# Patient Record
Sex: MALE | Race: NATIVE HAWAIIAN OR OTHER PACIFIC ISLANDER | NOT HISPANIC OR LATINO | Employment: FULL TIME | URBAN - METROPOLITAN AREA
[De-identification: names, ages, dates, MRNs, and addresses within clinical notes are randomized per-mention and may not be internally consistent; named-entity substitution may affect disease eponyms.]

---
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
## 2022-04-09 ENCOUNTER — HISTORICAL (OUTPATIENT)
Dept: ADMINISTRATIVE | Facility: HOSPITAL | Age: 22
End: 2022-04-09
Payer: COMMERCIAL

## 2022-04-26 VITALS
HEIGHT: 66 IN | SYSTOLIC BLOOD PRESSURE: 130 MMHG | OXYGEN SATURATION: 100 % | WEIGHT: 155.19 LBS | DIASTOLIC BLOOD PRESSURE: 81 MMHG | BODY MASS INDEX: 24.94 KG/M2

## 2022-05-16 ENCOUNTER — OFFICE VISIT (OUTPATIENT)
Dept: URGENT CARE | Facility: CLINIC | Age: 22
End: 2022-05-16
Payer: COMMERCIAL

## 2022-05-16 VITALS
HEIGHT: 66 IN | SYSTOLIC BLOOD PRESSURE: 132 MMHG | RESPIRATION RATE: 17 BRPM | TEMPERATURE: 99 F | DIASTOLIC BLOOD PRESSURE: 82 MMHG | BODY MASS INDEX: 24.27 KG/M2 | OXYGEN SATURATION: 100 % | WEIGHT: 151 LBS | HEART RATE: 69 BPM

## 2022-05-16 DIAGNOSIS — R51.9 ACUTE NONINTRACTABLE HEADACHE, UNSPECIFIED HEADACHE TYPE: Primary | ICD-10-CM

## 2022-05-16 DIAGNOSIS — R11.0 NAUSEA: ICD-10-CM

## 2022-05-16 DIAGNOSIS — J02.0 STREP PHARYNGITIS: ICD-10-CM

## 2022-05-16 LAB
CTP QC/QA: YES
FLUAV AG NPH QL: NEGATIVE
FLUBV AG NPH QL: NEGATIVE
S PYO RRNA THROAT QL PROBE: POSITIVE
SARS-COV-2 RDRP RESP QL NAA+PROBE: NEGATIVE

## 2022-05-16 PROCEDURE — 1159F PR MEDICATION LIST DOCUMENTED IN MEDICAL RECORD: ICD-10-PCS | Mod: CPTII,,, | Performed by: PHYSICIAN ASSISTANT

## 2022-05-16 PROCEDURE — 99213 PR OFFICE/OUTPT VISIT, EST, LEVL III, 20-29 MIN: ICD-10-PCS | Mod: 25,,, | Performed by: PHYSICIAN ASSISTANT

## 2022-05-16 PROCEDURE — 1160F RVW MEDS BY RX/DR IN RCRD: CPT | Mod: CPTII,,, | Performed by: PHYSICIAN ASSISTANT

## 2022-05-16 PROCEDURE — 1159F MED LIST DOCD IN RCRD: CPT | Mod: CPTII,,, | Performed by: PHYSICIAN ASSISTANT

## 2022-05-16 PROCEDURE — 3079F DIAST BP 80-89 MM HG: CPT | Mod: CPTII,,, | Performed by: PHYSICIAN ASSISTANT

## 2022-05-16 PROCEDURE — 99213 OFFICE O/P EST LOW 20 MIN: CPT | Mod: 25,,, | Performed by: PHYSICIAN ASSISTANT

## 2022-05-16 PROCEDURE — 3075F SYST BP GE 130 - 139MM HG: CPT | Mod: CPTII,,, | Performed by: PHYSICIAN ASSISTANT

## 2022-05-16 PROCEDURE — U0002: ICD-10-PCS | Mod: QW,,, | Performed by: PHYSICIAN ASSISTANT

## 2022-05-16 PROCEDURE — 3008F PR BODY MASS INDEX (BMI) DOCUMENTED: ICD-10-PCS | Mod: CPTII,,, | Performed by: PHYSICIAN ASSISTANT

## 2022-05-16 PROCEDURE — 87804 INFLUENZA ASSAY W/OPTIC: CPT | Mod: QW,,, | Performed by: PHYSICIAN ASSISTANT

## 2022-05-16 PROCEDURE — U0002 COVID-19 LAB TEST NON-CDC: HCPCS | Mod: QW,,, | Performed by: PHYSICIAN ASSISTANT

## 2022-05-16 PROCEDURE — 3075F PR MOST RECENT SYSTOLIC BLOOD PRESS GE 130-139MM HG: ICD-10-PCS | Mod: CPTII,,, | Performed by: PHYSICIAN ASSISTANT

## 2022-05-16 PROCEDURE — 87880 POCT RAPID STREP A: ICD-10-PCS | Mod: QW,,, | Performed by: PHYSICIAN ASSISTANT

## 2022-05-16 PROCEDURE — 1160F PR REVIEW ALL MEDS BY PRESCRIBER/CLIN PHARMACIST DOCUMENTED: ICD-10-PCS | Mod: CPTII,,, | Performed by: PHYSICIAN ASSISTANT

## 2022-05-16 PROCEDURE — 3008F BODY MASS INDEX DOCD: CPT | Mod: CPTII,,, | Performed by: PHYSICIAN ASSISTANT

## 2022-05-16 PROCEDURE — 3079F PR MOST RECENT DIASTOLIC BLOOD PRESSURE 80-89 MM HG: ICD-10-PCS | Mod: CPTII,,, | Performed by: PHYSICIAN ASSISTANT

## 2022-05-16 PROCEDURE — 87804 POCT INFLUENZA A/B: ICD-10-PCS | Mod: 59,QW,, | Performed by: PHYSICIAN ASSISTANT

## 2022-05-16 PROCEDURE — 87880 STREP A ASSAY W/OPTIC: CPT | Mod: QW,,, | Performed by: PHYSICIAN ASSISTANT

## 2022-05-16 RX ORDER — BUPROPION HYDROCHLORIDE 300 MG/1
300 TABLET ORAL
COMMUNITY
Start: 2022-02-23 | End: 2023-02-07

## 2022-05-16 RX ORDER — AMOXICILLIN AND CLAVULANATE POTASSIUM 875; 125 MG/1; MG/1
1 TABLET, FILM COATED ORAL 2 TIMES DAILY
Qty: 20 TABLET | Refills: 0 | Status: SHIPPED | OUTPATIENT
Start: 2022-05-16 | End: 2022-05-26

## 2022-05-16 NOTE — PROGRESS NOTES
"Subjective:       Patient ID: Samira Molina is a 21 y.o. male.    Vitals:  height is 5' 6" (1.676 m) and weight is 68.5 kg (151 lb). His temperature is 99.3 °F (37.4 °C). His blood pressure is 132/82 and his pulse is 69. His respiration is 17 and oxygen saturation is 100%.     Chief Complaint: Headache (Since yesterday) and Nausea (Since yesterday )    Patient is a 21-year-old male who presents to urgent care with complaints of headache and nausea for the past two days. Patient states headache is like a band around his head.  He denies any changes in vision, numbness or tingling, slurred speech, changes in gait, facial drooping, or weakness of extremities. Patient states he feels overall nauseous. He denies any associated vomiting, diarrhea, or abdominal pain. Patient denies any upper respiratory symptoms.  He is feeling somewhat achy. He otherwise denies any fever, chills, chest pain, shortness a breath, wheezing, rashes, or neck stiffness.    Review of Systems:  General: Denies fever, chills, fatigue, and change in appetite   Eyes: Denies change in vision, eye redness, eye drainage, eye pain  ENT: Denies ear pain or pressure, rhinorrhea, nasal congestion, sore throat, and trouble swallowing  Resp: Denies wheezing, and shortness of breath   Cardio: Denies chest pain, palpitations, pleuritic pain, and edema   GI: Denies vomiting, diarrhea, and abdominal pain   MSK: Denies trauma, joint pain, and trouble ambulating   Neuro: Denies LOC, dizziness, seizure like activity, and focal deficits   Skin: Denies rashes, open lesions and ulcers     Objective:     Physical Exam:  General: Well developed, well nourished, awake and alert. No acute distress.   Eye: PERRLA, EOMI, no scleral icterus, clear conjunctiva, eyelids normal.  Ear: No tragal tenderness. Ear canal patent. TMs intact bilaterally. Left TM erythematous. Right TM normal.   Mouth: Oropharynx with moderate erythema. Exudate on the posterior pharynx. No trismus. " No drooling. Tonsils removed. No peritonsillar swelling.   Neck: Positive for non tender cervical adenipathy, no neck stiffness,  trachea midline, no visible thyromegaly.   Respiratory: Clear to auscultation bilaterally, normal respiratory rate and inspiratory effort.   Cardiovascular: RRR w/o murmurs, no LE edema.   Musculoskeletal: Normal gait. No joint swelling.   Integumentary: No rashes or skin lesions noted. No cyanosis or jaundice.   Neurologic: Facial expressions even, CN1-12 grossly intact, speech is clear, cognition in tact.     Assessment:       1. Acute nonintractable headache, unspecified headache type    2. Nausea    3. Strep pharyngitis        Plan:     Take antibiotics as prescfibed until finished. Rest as much as possible and maintain adequate hydration. Monitor for fever. Ibuprofen and Tylenol for pain and fever. Swap out toothbrush after 4 days of antibiotics. You are contagious until you have been on antibiotics for 24 hours and have been fever free for 24 hours. Seek further medical attention immediately at the first sign of new or worsening symptoms. Follow up with PCP or pediatrician within the next 72 hours.     Acute nonintractable headache, unspecified headache type  -     POCT Influenza A/B  -     POCT COVID-19 Rapid Screening  -     POCT rapid strep A    Nausea  -     POCT Influenza A/B  -     POCT COVID-19 Rapid Screening  -     POCT rapid strep A    Strep pharyngitis    Other orders  -     amoxicillin-clavulanate 875-125mg (AUGMENTIN) 875-125 mg per tablet; Take 1 tablet by mouth 2 (two) times daily. for 10 days  Dispense: 20 tablet; Refill: 0

## 2022-05-16 NOTE — PATIENT INSTRUCTIONS
Take antibiotics as prescfibed until finished. Rest as much as possible and maintain adequate hydration. Monitor for fever. Ibuprofen and Tylenol for pain and fever. Swap out toothbrush after 4 days of antibiotics. You are contagious until you have been on antibiotics for 24 hours and have been fever free for 24 hours. Seek further medical attention immediately at the first sign of new or worsening symptoms. Follow up with PCP or pediatrician within the next 72 hours.

## 2022-06-20 ENCOUNTER — OFFICE VISIT (OUTPATIENT)
Dept: URGENT CARE | Facility: CLINIC | Age: 22
End: 2022-06-20
Payer: COMMERCIAL

## 2022-06-20 VITALS
SYSTOLIC BLOOD PRESSURE: 131 MMHG | DIASTOLIC BLOOD PRESSURE: 75 MMHG | HEIGHT: 66 IN | BODY MASS INDEX: 24.27 KG/M2 | OXYGEN SATURATION: 99 % | WEIGHT: 151 LBS | HEART RATE: 57 BPM | TEMPERATURE: 98 F | RESPIRATION RATE: 18 BRPM

## 2022-06-20 DIAGNOSIS — R11.2 NAUSEA AND VOMITING, INTRACTABILITY OF VOMITING NOT SPECIFIED, UNSPECIFIED VOMITING TYPE: Primary | ICD-10-CM

## 2022-06-20 PROCEDURE — 3078F DIAST BP <80 MM HG: CPT | Mod: CPTII,,, | Performed by: PHYSICIAN ASSISTANT

## 2022-06-20 PROCEDURE — 1160F RVW MEDS BY RX/DR IN RCRD: CPT | Mod: CPTII,,, | Performed by: PHYSICIAN ASSISTANT

## 2022-06-20 PROCEDURE — 1159F MED LIST DOCD IN RCRD: CPT | Mod: CPTII,,, | Performed by: PHYSICIAN ASSISTANT

## 2022-06-20 PROCEDURE — 3008F BODY MASS INDEX DOCD: CPT | Mod: CPTII,,, | Performed by: PHYSICIAN ASSISTANT

## 2022-06-20 PROCEDURE — 3008F PR BODY MASS INDEX (BMI) DOCUMENTED: ICD-10-PCS | Mod: CPTII,,, | Performed by: PHYSICIAN ASSISTANT

## 2022-06-20 PROCEDURE — 1160F PR REVIEW ALL MEDS BY PRESCRIBER/CLIN PHARMACIST DOCUMENTED: ICD-10-PCS | Mod: CPTII,,, | Performed by: PHYSICIAN ASSISTANT

## 2022-06-20 PROCEDURE — 1159F PR MEDICATION LIST DOCUMENTED IN MEDICAL RECORD: ICD-10-PCS | Mod: CPTII,,, | Performed by: PHYSICIAN ASSISTANT

## 2022-06-20 PROCEDURE — 99213 PR OFFICE/OUTPT VISIT, EST, LEVL III, 20-29 MIN: ICD-10-PCS | Mod: ,,, | Performed by: PHYSICIAN ASSISTANT

## 2022-06-20 PROCEDURE — 3075F SYST BP GE 130 - 139MM HG: CPT | Mod: CPTII,,, | Performed by: PHYSICIAN ASSISTANT

## 2022-06-20 PROCEDURE — 3075F PR MOST RECENT SYSTOLIC BLOOD PRESS GE 130-139MM HG: ICD-10-PCS | Mod: CPTII,,, | Performed by: PHYSICIAN ASSISTANT

## 2022-06-20 PROCEDURE — 99213 OFFICE O/P EST LOW 20 MIN: CPT | Mod: ,,, | Performed by: PHYSICIAN ASSISTANT

## 2022-06-20 PROCEDURE — 3078F PR MOST RECENT DIASTOLIC BLOOD PRESSURE < 80 MM HG: ICD-10-PCS | Mod: CPTII,,, | Performed by: PHYSICIAN ASSISTANT

## 2022-06-20 RX ORDER — ONDANSETRON 4 MG/1
4 TABLET, ORALLY DISINTEGRATING ORAL EVERY 8 HOURS PRN
Qty: 15 TABLET | Refills: 0 | Status: SHIPPED | OUTPATIENT
Start: 2022-06-20 | End: 2022-07-25

## 2022-06-20 NOTE — PROGRESS NOTES
"Subjective:       Patient ID: Samira Molina is a 21 y.o. male.    Vitals:  height is 5' 6" (1.676 m) and weight is 68.5 kg (151 lb). His oral temperature is 98.4 °F (36.9 °C). His blood pressure is 131/75 and his pulse is 57 (abnormal). His respiration is 18 and oxygen saturation is 99%.     Chief Complaint: Emesis    Nausea/Vomiting this AM.  He denies any associated diarrhea constipation focal abdominal pain fever sore throat cough or shortness of breath.  Patient states he is feeling a little better and needs a work excuse.  He declines COVID testing today.    Emesis         Gastrointestinal: Positive for vomiting.       Objective:      Physical Exam   Constitutional: He is oriented to person, place, and time. He appears well-developed.   HENT:   Head: Normocephalic and atraumatic.   Ears:   Right Ear: External ear normal.   Left Ear: External ear normal.   Nose: Nose normal.   Mouth/Throat: Mucous membranes are normal. Mucous membranes are moist. No posterior oropharyngeal erythema.   Eyes: Conjunctivae and lids are normal.   Neck: Trachea normal. Neck supple.   Cardiovascular: Normal rate, regular rhythm and normal heart sounds.   Pulmonary/Chest: Effort normal and breath sounds normal. No respiratory distress.   Abdominal: Normal appearance and bowel sounds are normal. He exhibits no distension, no abdominal bruit, no pulsatile midline mass and no mass. Soft. There is abdominal tenderness. There is no rebound and no guarding.   Musculoskeletal: Normal range of motion.         General: Normal range of motion.   Neurological: He is alert and oriented to person, place, and time. He has normal strength.   Skin: Skin is warm, dry, intact, not diaphoretic and not pale.   Psychiatric: His speech is normal and behavior is normal. Judgment and thought content normal.   Nursing note and vitals reviewed.   Mild generalized lower abdominal tenderness.         Previous History      Review of patient's allergies " "indicates:  No Known Allergies    Past Medical History:   Diagnosis Date    Depression      Current Outpatient Medications   Medication Instructions    buPROPion (WELLBUTRIN XL) 300 mg, Oral    ondansetron (ZOFRAN-ODT) 4 mg, Oral, Every 8 hours PRN     History reviewed. No pertinent surgical history.  Family History   Family history unknown: Yes       Social History     Tobacco Use    Smoking status: Never Smoker    Smokeless tobacco: Never Used   Substance Use Topics    Alcohol use: Never    Drug use: Never        Physical Exam      Vital Signs Reviewed   /75   Pulse (!) 57   Temp 98.4 °F (36.9 °C) (Oral)   Resp 18   Ht 5' 6" (1.676 m)   Wt 68.5 kg (151 lb)   SpO2 99%   BMI 24.37 kg/m²        Procedures    Procedures     Labs     Results for orders placed or performed in visit on 05/16/22   POCT Influenza A/B   Result Value Ref Range    Rapid Influenza A Ag Negative Negative    Rapid Influenza B Ag Negative Negative     Acceptable Yes    POCT COVID-19 Rapid Screening   Result Value Ref Range    POC Rapid COVID Negative Negative     Acceptable Yes    POCT rapid strep A   Result Value Ref Range    Rapid Strep A Screen Positive (A) Negative     Acceptable Yes          Assessment:       1. Nausea and vomiting, intractability of vomiting not specified, unspecified vomiting type          Plan:         Nausea and vomiting, intractability of vomiting not specified, unspecified vomiting type  -     ondansetron (ZOFRAN-ODT) 4 MG TbDL; Take 1 tablet (4 mg total) by mouth every 8 (eight) hours as needed (nausea).  Dispense: 15 tablet; Refill: 0     Drink plenty of fluids    BRAT diet.     Imodium as directed if needed for non-bloody diarrhea.     Get plenty of rest.    Follow-up with your primary care doctor      Go to emergency department with any significant change or worsening symptoms.    Tylenol or Motrin as needed for fever.                 "

## 2022-07-18 ENCOUNTER — TELEPHONE (OUTPATIENT)
Dept: FAMILY MEDICINE | Facility: CLINIC | Age: 22
End: 2022-07-18
Payer: COMMERCIAL

## 2022-07-18 DIAGNOSIS — Z11.59 NEED FOR HEPATITIS C SCREENING TEST: ICD-10-CM

## 2022-07-18 DIAGNOSIS — Z00.00 ANNUAL PHYSICAL EXAM: Primary | ICD-10-CM

## 2022-07-18 DIAGNOSIS — Z11.4 SCREENING FOR HIV (HUMAN IMMUNODEFICIENCY VIRUS): ICD-10-CM

## 2022-07-18 PROBLEM — F90.9 ATTENTION DEFICIT HYPERACTIVITY DISORDER (ADHD): Status: ACTIVE | Noted: 2022-07-18

## 2022-07-18 PROBLEM — F41.1 GENERALIZED ANXIETY DISORDER: Status: ACTIVE | Noted: 2022-07-18

## 2022-07-18 NOTE — TELEPHONE ENCOUNTER
1. Are there any outstanding tasks in patient's chart?    labs  2. Do we have outstanding/pending referrals?    n  3. Has the patient been seen in an ER, Urgent Care, or admitted since last visit?  Urgent care    4. Has patient seen any other health care providers since last visit?  n    5.  Has patient had any blood work or x-rays done since last visit?  Patient will complete labs at [facility]    Please order wellness labs at [facility]

## 2022-07-23 ENCOUNTER — LAB VISIT (OUTPATIENT)
Dept: LAB | Facility: HOSPITAL | Age: 22
End: 2022-07-23
Attending: NURSE PRACTITIONER
Payer: COMMERCIAL

## 2022-07-23 DIAGNOSIS — Z00.00 ANNUAL PHYSICAL EXAM: ICD-10-CM

## 2022-07-23 DIAGNOSIS — Z11.59 NEED FOR HEPATITIS C SCREENING TEST: ICD-10-CM

## 2022-07-23 DIAGNOSIS — Z11.4 SCREENING FOR HIV (HUMAN IMMUNODEFICIENCY VIRUS): ICD-10-CM

## 2022-07-23 LAB
ALBUMIN SERPL-MCNC: 4.6 GM/DL (ref 3.5–5)
ALBUMIN/GLOB SERPL: 1.5 RATIO (ref 1.1–2)
ALP SERPL-CCNC: 61 UNIT/L
ALT SERPL-CCNC: 44 UNIT/L (ref 0–55)
AST SERPL-CCNC: 25 UNIT/L (ref 5–34)
BASOPHILS # BLD AUTO: 0.03 X10(3)/MCL (ref 0–0.2)
BASOPHILS NFR BLD AUTO: 0.7 %
BILIRUBIN DIRECT+TOT PNL SERPL-MCNC: 1.3 MG/DL
BUN SERPL-MCNC: 22.3 MG/DL (ref 8.9–20.6)
CALCIUM SERPL-MCNC: 9.8 MG/DL (ref 8.4–10.2)
CHLORIDE SERPL-SCNC: 106 MMOL/L (ref 98–107)
CHOLEST SERPL-MCNC: 135 MG/DL
CHOLEST/HDLC SERPL: 3 {RATIO} (ref 0–5)
CO2 SERPL-SCNC: 26 MMOL/L (ref 22–29)
CREAT SERPL-MCNC: 1.19 MG/DL (ref 0.73–1.18)
EOSINOPHIL # BLD AUTO: 0.12 X10(3)/MCL (ref 0–0.9)
EOSINOPHIL NFR BLD AUTO: 2.8 %
ERYTHROCYTE [DISTWIDTH] IN BLOOD BY AUTOMATED COUNT: 12.8 % (ref 11.5–17)
GLOBULIN SER-MCNC: 3.1 GM/DL (ref 2.4–3.5)
GLUCOSE SERPL-MCNC: 82 MG/DL (ref 74–100)
HCT VFR BLD AUTO: 46.7 % (ref 42–52)
HCV AB SERPL QL IA: NONREACTIVE
HDLC SERPL-MCNC: 48 MG/DL (ref 35–60)
HGB BLD-MCNC: 14.8 GM/DL (ref 14–18)
HIV 1+2 AB+HIV1 P24 AG SERPL QL IA: NONREACTIVE
IMM GRANULOCYTES # BLD AUTO: 0 X10(3)/MCL (ref 0–0.04)
IMM GRANULOCYTES NFR BLD AUTO: 0 %
LDLC SERPL CALC-MCNC: 78 MG/DL (ref 50–140)
LYMPHOCYTES # BLD AUTO: 1.68 X10(3)/MCL (ref 0.6–4.6)
LYMPHOCYTES NFR BLD AUTO: 39.3 %
MCH RBC QN AUTO: 27.1 PG (ref 27–31)
MCHC RBC AUTO-ENTMCNC: 31.7 MG/DL (ref 33–36)
MCV RBC AUTO: 85.5 FL (ref 80–94)
MONOCYTES # BLD AUTO: 0.38 X10(3)/MCL (ref 0.1–1.3)
MONOCYTES NFR BLD AUTO: 8.9 %
NEUTROPHILS # BLD AUTO: 2.1 X10(3)/MCL (ref 2.1–9.2)
NEUTROPHILS NFR BLD AUTO: 48.3 %
PLATELET # BLD AUTO: 246 X10(3)/MCL (ref 130–400)
PMV BLD AUTO: 10.4 FL (ref 7.4–10.4)
POTASSIUM SERPL-SCNC: 4.5 MMOL/L (ref 3.5–5.1)
PROT SERPL-MCNC: 7.7 GM/DL (ref 6.4–8.3)
RBC # BLD AUTO: 5.46 X10(6)/MCL (ref 4.7–6.1)
SODIUM SERPL-SCNC: 142 MMOL/L (ref 136–145)
TRIGL SERPL-MCNC: 46 MG/DL (ref 34–140)
TSH SERPL-ACNC: 1.12 UIU/ML (ref 0.35–4.94)
VLDLC SERPL CALC-MCNC: 9 MG/DL
WBC # SPEC AUTO: 4.3 X10(3)/MCL (ref 4.5–11.5)

## 2022-07-23 PROCEDURE — 84443 ASSAY THYROID STIM HORMONE: CPT

## 2022-07-23 PROCEDURE — 80061 LIPID PANEL: CPT

## 2022-07-23 PROCEDURE — 36415 COLL VENOUS BLD VENIPUNCTURE: CPT

## 2022-07-23 PROCEDURE — 87389 HIV-1 AG W/HIV-1&-2 AB AG IA: CPT

## 2022-07-23 PROCEDURE — 86803 HEPATITIS C AB TEST: CPT

## 2022-07-23 PROCEDURE — 80053 COMPREHEN METABOLIC PANEL: CPT

## 2022-07-23 PROCEDURE — 85025 COMPLETE CBC W/AUTO DIFF WBC: CPT

## 2022-07-25 ENCOUNTER — OFFICE VISIT (OUTPATIENT)
Dept: FAMILY MEDICINE | Facility: CLINIC | Age: 22
End: 2022-07-25
Payer: COMMERCIAL

## 2022-07-25 VITALS
BODY MASS INDEX: 25.81 KG/M2 | TEMPERATURE: 99 F | HEIGHT: 66 IN | WEIGHT: 160.63 LBS | HEART RATE: 76 BPM | RESPIRATION RATE: 16 BRPM | SYSTOLIC BLOOD PRESSURE: 120 MMHG | OXYGEN SATURATION: 97 % | DIASTOLIC BLOOD PRESSURE: 76 MMHG

## 2022-07-25 DIAGNOSIS — Z11.59 NEED FOR HEPATITIS C SCREENING TEST: ICD-10-CM

## 2022-07-25 DIAGNOSIS — Z11.4 SCREENING FOR HIV (HUMAN IMMUNODEFICIENCY VIRUS): ICD-10-CM

## 2022-07-25 DIAGNOSIS — Z13.31 POSITIVE DEPRESSION SCREENING: ICD-10-CM

## 2022-07-25 DIAGNOSIS — F33.9 DEPRESSION, RECURRENT: ICD-10-CM

## 2022-07-25 DIAGNOSIS — Z00.00 ANNUAL PHYSICAL EXAM: Primary | ICD-10-CM

## 2022-07-25 PROBLEM — F32.9 MAJOR DEPRESSIVE DISORDER WITH SINGLE EPISODE: Status: RESOLVED | Noted: 2022-07-25 | Resolved: 2022-07-25

## 2022-07-25 PROBLEM — F32.9 MAJOR DEPRESSIVE DISORDER WITH SINGLE EPISODE: Status: ACTIVE | Noted: 2022-07-25

## 2022-07-25 PROCEDURE — 1159F PR MEDICATION LIST DOCUMENTED IN MEDICAL RECORD: ICD-10-PCS | Mod: CPTII,,, | Performed by: NURSE PRACTITIONER

## 2022-07-25 PROCEDURE — 99395 PREV VISIT EST AGE 18-39: CPT | Mod: ,,, | Performed by: NURSE PRACTITIONER

## 2022-07-25 PROCEDURE — 1160F PR REVIEW ALL MEDS BY PRESCRIBER/CLIN PHARMACIST DOCUMENTED: ICD-10-PCS | Mod: CPTII,,, | Performed by: NURSE PRACTITIONER

## 2022-07-25 PROCEDURE — 3074F PR MOST RECENT SYSTOLIC BLOOD PRESSURE < 130 MM HG: ICD-10-PCS | Mod: CPTII,,, | Performed by: NURSE PRACTITIONER

## 2022-07-25 PROCEDURE — 1159F MED LIST DOCD IN RCRD: CPT | Mod: CPTII,,, | Performed by: NURSE PRACTITIONER

## 2022-07-25 PROCEDURE — 3008F PR BODY MASS INDEX (BMI) DOCUMENTED: ICD-10-PCS | Mod: CPTII,,, | Performed by: NURSE PRACTITIONER

## 2022-07-25 PROCEDURE — 99395 PR PREVENTIVE VISIT,EST,18-39: ICD-10-PCS | Mod: ,,, | Performed by: NURSE PRACTITIONER

## 2022-07-25 PROCEDURE — 1160F RVW MEDS BY RX/DR IN RCRD: CPT | Mod: CPTII,,, | Performed by: NURSE PRACTITIONER

## 2022-07-25 PROCEDURE — 3078F PR MOST RECENT DIASTOLIC BLOOD PRESSURE < 80 MM HG: ICD-10-PCS | Mod: CPTII,,, | Performed by: NURSE PRACTITIONER

## 2022-07-25 PROCEDURE — 3074F SYST BP LT 130 MM HG: CPT | Mod: CPTII,,, | Performed by: NURSE PRACTITIONER

## 2022-07-25 PROCEDURE — 3008F BODY MASS INDEX DOCD: CPT | Mod: CPTII,,, | Performed by: NURSE PRACTITIONER

## 2022-07-25 PROCEDURE — 3078F DIAST BP <80 MM HG: CPT | Mod: CPTII,,, | Performed by: NURSE PRACTITIONER

## 2022-07-25 NOTE — PROGRESS NOTES
Subjective:       Patient ID: Samira Molina is a 21 y.o. male.    Chief Complaint: Annual Exam      HPI     This is a 21-year-old  male who presents to clinic today for his annual wellness exam.  Patient has a history of anxiety, depression, ADHD.  Patient states that he feels like his Wellbutrin really is not working as well as it used to.  States that he does not have the motivation to get up out of bed every day go to work.  He does get out of bed and go to work every day but it is a struggle.  He is also having trouble sleeping at night.  Denies any suicidal or homicidal ideations.  States that he just has a lot going on and just got a new job.  States new job is less stressful so he should not be having trouble getting out of bed to go to work.      Review of Systems   Constitutional: Negative.    HENT: Negative.    Eyes: Negative.    Respiratory: Negative.    Cardiovascular: Negative.    Gastrointestinal: Negative.    Musculoskeletal: Negative.    Integumentary:  Negative.   Allergic/Immunologic: Negative.    Neurological: Negative.    Hematological: Negative.    Psychiatric/Behavioral: Positive for dysphoric mood.   All other systems reviewed and are negative.          The patient's Health Maintenance was reviewed and the following appears to be due:   Health Maintenance Due   Topic Date Due    COVID-19 Vaccine (1) Never done    HPV Vaccines (1 - Male 2-dose series) Never done       Past Medical History:  Past Medical History:   Diagnosis Date    Anxiety disorder, unspecified     Attention-deficit hyperactivity disorder, unspecified type     Depression      Past Surgical History:   Procedure Laterality Date    ADENOIDECTOMY      MOUTH SURGERY      MYRINGOTOMY W/ TUBES       Review of patient's allergies indicates:  No Known Allergies  Current Outpatient Medications on File Prior to Visit   Medication Sig Dispense Refill    buPROPion (WELLBUTRIN XL) 300 MG 24 hr tablet Take 300 mg by mouth.   "    [DISCONTINUED] ondansetron (ZOFRAN-ODT) 4 MG TbDL Take 1 tablet (4 mg total) by mouth every 8 (eight) hours as needed (nausea). 15 tablet 0     No current facility-administered medications on file prior to visit.     Social History     Socioeconomic History    Marital status: Single   Tobacco Use    Smoking status: Never Smoker    Smokeless tobacco: Never Used   Substance and Sexual Activity    Alcohol use: Yes     Comment: socially    Drug use: Never    Sexual activity: Yes     Family History   Family history unknown: Yes         Objective:       /76 (BP Location: Left arm)   Pulse 76   Temp 99 °F (37.2 °C) (Oral)   Resp 16   Ht 5' 6" (1.676 m)   Wt 72.8 kg (160 lb 9.6 oz)   SpO2 97%   BMI 25.92 kg/m²      Physical Exam  Vitals and nursing note reviewed.   Constitutional:       Appearance: Normal appearance. He is normal weight.   HENT:      Head: Normocephalic and atraumatic.      Right Ear: Tympanic membrane, ear canal and external ear normal.      Left Ear: Tympanic membrane, ear canal and external ear normal.      Nose: Nose normal.      Mouth/Throat:      Mouth: Mucous membranes are moist.      Pharynx: Oropharynx is clear.   Eyes:      Extraocular Movements: Extraocular movements intact.      Conjunctiva/sclera: Conjunctivae normal.      Pupils: Pupils are equal, round, and reactive to light.   Cardiovascular:      Rate and Rhythm: Normal rate and regular rhythm.      Pulses: Normal pulses.      Heart sounds: Normal heart sounds.   Pulmonary:      Effort: Pulmonary effort is normal.      Breath sounds: Normal breath sounds.   Abdominal:      General: Abdomen is flat. Bowel sounds are normal.      Palpations: Abdomen is soft.   Musculoskeletal:         General: Normal range of motion.      Cervical back: Normal range of motion and neck supple.   Skin:     General: Skin is warm and dry.   Neurological:      General: No focal deficit present.      Mental Status: He is alert and oriented " to person, place, and time.   Psychiatric:         Mood and Affect: Mood normal.         Behavior: Behavior normal.         Thought Content: Thought content normal.         Judgment: Judgment normal.         Labs  Lab Visit on 07/23/2022   Component Date Value Ref Range Status    Sodium Level 07/23/2022 142  136 - 145 mmol/L Final    Potassium Level 07/23/2022 4.5  3.5 - 5.1 mmol/L Final    Chloride 07/23/2022 106  98 - 107 mmol/L Final    Carbon Dioxide 07/23/2022 26  22 - 29 mmol/L Final    Glucose Level 07/23/2022 82  74 - 100 mg/dL Final    Blood Urea Nitrogen 07/23/2022 22.3 (A) 8.9 - 20.6 mg/dL Final    Creatinine 07/23/2022 1.19 (A) 0.73 - 1.18 mg/dL Final    Calcium Level Total 07/23/2022 9.8  8.4 - 10.2 mg/dL Final    Protein Total 07/23/2022 7.7  6.4 - 8.3 gm/dL Final    Albumin Level 07/23/2022 4.6  3.5 - 5.0 gm/dL Final    Globulin 07/23/2022 3.1  2.4 - 3.5 gm/dL Final    Albumin/Globulin Ratio 07/23/2022 1.5  1.1 - 2.0 ratio Final    Bilirubin Total 07/23/2022 1.3  <=1.5 mg/dL Final    Alkaline Phosphatase 07/23/2022 61  <=750 unit/L Final    Alanine Aminotransferase 07/23/2022 44  0 - 55 unit/L Final    Aspartate Aminotransferase 07/23/2022 25  5 - 34 unit/L Final    Estimated GFR-Non  07/23/2022 >60  mls/min/1.73/m2 Final    Hepatitis C Antibody 07/23/2022 Nonreactive  Nonreactive Final    HIV 07/23/2022 Nonreactive  Nonreactive Final    Cholesterol Total 07/23/2022 135  <=200 mg/dL Final    HDL Cholesterol 07/23/2022 48  35 - 60 mg/dL Final    Triglyceride 07/23/2022 46  34 - 140 mg/dL Final    Cholesterol/HDL Ratio 07/23/2022 3  0 - 5 Final    Very Low Density Lipoprotein 07/23/2022 9   Final    LDL Cholesterol 07/23/2022 78.00  50.00 - 140.00 mg/dL Final    Thyroid Stimulating Hormone 07/23/2022 1.1230  0.3500 - 4.9400 uIU/mL Final    WBC 07/23/2022 4.3 (A) 4.5 - 11.5 x10(3)/mcL Final    RBC 07/23/2022 5.46  4.70 - 6.10 x10(6)/mcL Final    Hgb 07/23/2022  14.8  14.0 - 18.0 gm/dL Final    Hct 07/23/2022 46.7  42.0 - 52.0 % Final    MCV 07/23/2022 85.5  80.0 - 94.0 fL Final    MCH 07/23/2022 27.1  27.0 - 31.0 pg Final    MCHC 07/23/2022 31.7 (A) 33.0 - 36.0 mg/dL Final    RDW 07/23/2022 12.8  11.5 - 17.0 % Final    Platelet 07/23/2022 246  130 - 400 x10(3)/mcL Final    MPV 07/23/2022 10.4  7.4 - 10.4 fL Final    Neut % 07/23/2022 48.3  % Final    Lymph % 07/23/2022 39.3  % Final    Mono % 07/23/2022 8.9  % Final    Eos % 07/23/2022 2.8  % Final    Basophil % 07/23/2022 0.7  % Final    Lymph # 07/23/2022 1.68  0.6 - 4.6 x10(3)/mcL Final    Neut # 07/23/2022 2.1  2.1 - 9.2 x10(3)/mcL Final    Mono # 07/23/2022 0.38  0.1 - 1.3 x10(3)/mcL Final    Eos # 07/23/2022 0.12  0 - 0.9 x10(3)/mcL Final    Baso # 07/23/2022 0.03  0 - 0.2 x10(3)/mcL Final    IG# 07/23/2022 0.00  0 - 0.04 x10(3)/mcL Final    IG% 07/23/2022 0.0  % Final             Assessment:       Problem List Items Addressed This Visit        Psychiatric    Depression, recurrent      Other Visit Diagnoses     Annual physical exam    -  Primary    Need for hepatitis C screening test        Screening for HIV (human immunodeficiency virus)        Positive depression screening        I have reviewed the positive depression score which warrants active treatment with psychotherapy and/or medications.          Plan:         1. Annual physical exam  Labs reviewed with patient, will repeat in 1 year.    2. Need for hepatitis C screening test  Nonreactive    3. Screening for HIV (human immunodeficiency virus)  Nonreactive    4. Positive depression screening  Have discussed with patient multiple times that I think needs to go to counseling.  [facility], phone number given to patient to schedule appointment.  Follow-up 6 weeks.    5. Depression, recurrent  For I recommended counseling prior to changing patient's medication.  I do think a lot of this is situational and patient even  states he feels like he just needs to talk to someone.  Will follow-up in 6 weeks after he starts counseling and if needed adjust medications at that time.            I have used clinical judgement based on duration and functional status to consider definite necessity for treatment.

## 2022-07-25 NOTE — LETTER
July 25, 2022      [facility]  [address] [place] [address]  Phone: [phone]       Patient: Samira Molina   YOB: 2000  Date of Visit: 07/25/2022    To Whom It May Concern:    Henrietta Molina  was at [facility] on 07/25/2022. The patient may return to work/school on 07/26/2022 with no restrictions. If you have any questions or concerns, or if I can be of further assistance, please do not hesitate to contact me.    Sincerely,    SHABNAM Tay

## 2022-07-28 ENCOUNTER — TELEPHONE (OUTPATIENT)
Dept: FAMILY MEDICINE | Facility: CLINIC | Age: 22
End: 2022-07-28
Payer: COMMERCIAL

## 2022-07-28 NOTE — TELEPHONE ENCOUNTER
----- Message from Laila Khan MA sent at 7/28/2022 11:14 AM CDT -----  Regarding: FW: referral    ----- Message -----  From: Keyana Harry  Sent: 7/28/2022  10:14 AM CDT  To: Jaylin FLORENCE Staff  Subject: referral                                         Type:  Patient Requesting Referral    Who Called:pt  Does the patient already have the specialty appointment scheduled?:no  If yes, what is the date of that appointment?:  Referral to What Specialty:psychologist   Reason for Referral:therapy or counseling  Does the patient want the referral with a specific physician?:  Is the [facility]?:  Patient Requesting a Response?:yes  Would the patient rather a call back or a response via [facility]? C/b  Best Call Back Number:[phone]  Additional Information:  [facility] not accept pt's insurance   Pt needs a referral that accepts [employer] medicaid

## 2022-07-28 NOTE — TELEPHONE ENCOUNTER
Patient does not know if he has medicaid.  He was notified to contact his insurance or look on the insurance website to see which places he would be able to schedule with.  Patient verbalized understanding.

## 2022-07-28 NOTE — TELEPHONE ENCOUNTER
Patient doesn't have Medicaid. He has private [employer]. Does he have Medicaid secondary? I didn't refer him to the counseling place, I just suggested it because their website said they take [employer]. He needs to check with his insurance what places take it or just call around and ask.

## 2022-09-02 ENCOUNTER — TELEPHONE (OUTPATIENT)
Dept: FAMILY MEDICINE | Facility: CLINIC | Age: 22
End: 2022-09-02
Payer: COMMERCIAL

## 2022-09-02 NOTE — TELEPHONE ENCOUNTER
Are there any outstanding tasks in patient's chart?    n  2. Do we have outstanding/pending referrals?  n    3. Has the patient been seen in an ER, Urgent Care, or admitted since last visit?  Jessica Perez-Covid Test-Neg    4. Has patient seen any other health care providers since last visit?  n    5.  Has patient had any blood work or x-rays done since last visit?   n

## 2022-09-09 ENCOUNTER — OFFICE VISIT (OUTPATIENT)
Dept: FAMILY MEDICINE | Facility: CLINIC | Age: 22
End: 2022-09-09
Payer: COMMERCIAL

## 2022-09-09 VITALS
WEIGHT: 163.19 LBS | OXYGEN SATURATION: 99 % | RESPIRATION RATE: 16 BRPM | HEART RATE: 69 BPM | TEMPERATURE: 98 F | SYSTOLIC BLOOD PRESSURE: 120 MMHG | DIASTOLIC BLOOD PRESSURE: 82 MMHG | HEIGHT: 66 IN | BODY MASS INDEX: 26.23 KG/M2

## 2022-09-09 DIAGNOSIS — F41.1 GENERALIZED ANXIETY DISORDER: ICD-10-CM

## 2022-09-09 DIAGNOSIS — J32.9 SINUSITIS, UNSPECIFIED CHRONICITY, UNSPECIFIED LOCATION: ICD-10-CM

## 2022-09-09 DIAGNOSIS — J02.9 SORE THROAT: ICD-10-CM

## 2022-09-09 DIAGNOSIS — F33.9 DEPRESSION, RECURRENT: Primary | ICD-10-CM

## 2022-09-09 LAB
CTP QC/QA: YES
S PYO RRNA THROAT QL PROBE: NEGATIVE

## 2022-09-09 PROCEDURE — 96372 THER/PROPH/DIAG INJ SC/IM: CPT | Mod: ,,, | Performed by: NURSE PRACTITIONER

## 2022-09-09 PROCEDURE — 99213 OFFICE O/P EST LOW 20 MIN: CPT | Mod: 25,,, | Performed by: NURSE PRACTITIONER

## 2022-09-09 PROCEDURE — 1160F PR REVIEW ALL MEDS BY PRESCRIBER/CLIN PHARMACIST DOCUMENTED: ICD-10-PCS | Mod: CPTII,,, | Performed by: NURSE PRACTITIONER

## 2022-09-09 PROCEDURE — 87880 POCT RAPID STREP A: ICD-10-PCS | Mod: QW,,, | Performed by: NURSE PRACTITIONER

## 2022-09-09 PROCEDURE — 1159F MED LIST DOCD IN RCRD: CPT | Mod: CPTII,,, | Performed by: NURSE PRACTITIONER

## 2022-09-09 PROCEDURE — 1160F RVW MEDS BY RX/DR IN RCRD: CPT | Mod: CPTII,,, | Performed by: NURSE PRACTITIONER

## 2022-09-09 PROCEDURE — 96372 PR INJECTION,THERAP/PROPH/DIAG2ST, IM OR SUBCUT: ICD-10-PCS | Mod: ,,, | Performed by: NURSE PRACTITIONER

## 2022-09-09 PROCEDURE — 3074F PR MOST RECENT SYSTOLIC BLOOD PRESSURE < 130 MM HG: ICD-10-PCS | Mod: CPTII,,, | Performed by: NURSE PRACTITIONER

## 2022-09-09 PROCEDURE — 3008F PR BODY MASS INDEX (BMI) DOCUMENTED: ICD-10-PCS | Mod: CPTII,,, | Performed by: NURSE PRACTITIONER

## 2022-09-09 PROCEDURE — 3079F PR MOST RECENT DIASTOLIC BLOOD PRESSURE 80-89 MM HG: ICD-10-PCS | Mod: CPTII,,, | Performed by: NURSE PRACTITIONER

## 2022-09-09 PROCEDURE — 3079F DIAST BP 80-89 MM HG: CPT | Mod: CPTII,,, | Performed by: NURSE PRACTITIONER

## 2022-09-09 PROCEDURE — 3008F BODY MASS INDEX DOCD: CPT | Mod: CPTII,,, | Performed by: NURSE PRACTITIONER

## 2022-09-09 PROCEDURE — 3074F SYST BP LT 130 MM HG: CPT | Mod: CPTII,,, | Performed by: NURSE PRACTITIONER

## 2022-09-09 PROCEDURE — 87880 STREP A ASSAY W/OPTIC: CPT | Mod: QW,,, | Performed by: NURSE PRACTITIONER

## 2022-09-09 PROCEDURE — 99213 PR OFFICE/OUTPT VISIT, EST, LEVL III, 20-29 MIN: ICD-10-PCS | Mod: 25,,, | Performed by: NURSE PRACTITIONER

## 2022-09-09 PROCEDURE — 1159F PR MEDICATION LIST DOCUMENTED IN MEDICAL RECORD: ICD-10-PCS | Mod: CPTII,,, | Performed by: NURSE PRACTITIONER

## 2022-09-09 RX ORDER — AZITHROMYCIN 250 MG/1
TABLET, FILM COATED ORAL
Qty: 6 TABLET | Refills: 0 | Status: SHIPPED | OUTPATIENT
Start: 2022-09-09 | End: 2022-09-14

## 2022-09-09 RX ORDER — BETAMETHASONE SODIUM PHOSPHATE AND BETAMETHASONE ACETATE 3; 3 MG/ML; MG/ML
6 INJECTION, SUSPENSION INTRA-ARTICULAR; INTRALESIONAL; INTRAMUSCULAR; SOFT TISSUE
Status: COMPLETED | OUTPATIENT
Start: 2022-09-09 | End: 2022-09-09

## 2022-09-09 RX ADMIN — BETAMETHASONE SODIUM PHOSPHATE AND BETAMETHASONE ACETATE 6 MG: 3; 3 INJECTION, SUSPENSION INTRA-ARTICULAR; INTRALESIONAL; INTRAMUSCULAR; SOFT TISSUE at 08:09

## 2022-09-09 NOTE — LETTER
September 9, 2022      [facility]  [address] [place] [address]  Phone: [phone]       Patient: Samira Molina   YOB: 2000  Date of Visit: 09/09/2022    To Whom It May Concern:    Henrietta Molina  was at [facility] on 09/09/2022. The patient may return to work/school on 09/10/2022 with no restrictions. If you have any questions or concerns, or if I can be of further assistance, please do not hesitate to contact me.    Sincerely,    SHABNAM Tay

## 2022-09-09 NOTE — PROGRESS NOTES
Subjective:       Patient ID: Samira Molina is a 21 y.o. male.    Chief Complaint: Depression (6 week f/u/Refill Wellbutrin), post nasal drip (X3 days), sinus congestion, sneezing, and Sore Throat      HPI   This is a 21-year-old  male who presents to clinic today for 1 month follow-up for anxiety and depression.  States he is doing much better.  States a lot of things have gotten better with his home life.  Does have a complaint today sinus congestion, facial pain, sore throat.  Denies any fever.  Symptoms have been going on now for 2 weeks.  He was seen at urgent care last week and had a negative COVID test but they did not treat him at all.    Review of Systems  Comprehensive review of systems negative except as stated in HPI    The patient's Health Maintenance was reviewed and the following appears to be due:   Health Maintenance Due   Topic Date Due    COVID-19 Vaccine (1) Never done    HPV Vaccines (1 - Male 2-dose series) Never done    Influenza Vaccine (1) 09/01/2022       Past Medical History:  Past Medical History:   Diagnosis Date    Anxiety disorder, unspecified     Attention-deficit hyperactivity disorder, unspecified type     Depression      Past Surgical History:   Procedure Laterality Date    ADENOIDECTOMY      MOUTH SURGERY      MYRINGOTOMY W/ TUBES       Review of patient's allergies indicates:  No Known Allergies  Current Outpatient Medications on File Prior to Visit   Medication Sig Dispense Refill    buPROPion (WELLBUTRIN XL) 300 MG 24 hr tablet Take 300 mg by mouth.       No current facility-administered medications on file prior to visit.     Social History     Socioeconomic History    Marital status: Single   Tobacco Use    Smoking status: Never    Smokeless tobacco: Never   Substance and Sexual Activity    Alcohol use: Yes     Comment: socially    Drug use: Never    Sexual activity: Yes     Partners: Female     Family History   Family history unknown: Yes       Objective:       BP  "120/82 (BP Location: Left arm)   Pulse 69   Temp 97.9 °F (36.6 °C) (Oral)   Resp 16   Ht 5' 6" (1.676 m)   Wt 74 kg (163 lb 3.2 oz)   SpO2 99%   BMI 26.34 kg/m²      Physical Exam  Vitals and nursing note reviewed.   Constitutional:       Appearance: Normal appearance. He is normal weight.   HENT:      Head: Normocephalic and atraumatic.      Right Ear: Tympanic membrane, ear canal and external ear normal.      Left Ear: Tympanic membrane, ear canal and external ear normal.      Nose:      Right Sinus: Maxillary sinus tenderness present.      Mouth/Throat:      Mouth: Mucous membranes are moist.      Pharynx: Oropharynx is clear.   Eyes:      Extraocular Movements: Extraocular movements intact.      Conjunctiva/sclera: Conjunctivae normal.      Pupils: Pupils are equal, round, and reactive to light.   Cardiovascular:      Rate and Rhythm: Normal rate and regular rhythm.      Pulses: Normal pulses.      Heart sounds: Normal heart sounds.   Pulmonary:      Effort: Pulmonary effort is normal.      Breath sounds: Normal breath sounds.   Musculoskeletal:         General: Normal range of motion.      Cervical back: Normal range of motion and neck supple.   Skin:     General: Skin is warm and dry.   Neurological:      General: No focal deficit present.      Mental Status: He is alert and oriented to person, place, and time.   Psychiatric:         Mood and Affect: Mood normal.         Behavior: Behavior normal.         Thought Content: Thought content normal.         Judgment: Judgment normal.       Labs  Office Visit on 09/09/2022   Component Date Value Ref Range Status    Rapid Strep A Screen 09/09/2022 Negative  Negative Final     Acceptable 09/09/2022 Yes   Final   Lab Visit on 07/23/2022   Component Date Value Ref Range Status    Sodium Level 07/23/2022 142  136 - 145 mmol/L Final    Potassium Level 07/23/2022 4.5  3.5 - 5.1 mmol/L Final    Chloride 07/23/2022 106  98 - 107 mmol/L Final    Carbon " Dioxide 07/23/2022 26  22 - 29 mmol/L Final    Glucose Level 07/23/2022 82  74 - 100 mg/dL Final    Blood Urea Nitrogen 07/23/2022 22.3 (H)  8.9 - 20.6 mg/dL Final    Creatinine 07/23/2022 1.19 (H)  0.73 - 1.18 mg/dL Final    Calcium Level Total 07/23/2022 9.8  8.4 - 10.2 mg/dL Final    Protein Total 07/23/2022 7.7  6.4 - 8.3 gm/dL Final    Albumin Level 07/23/2022 4.6  3.5 - 5.0 gm/dL Final    Globulin 07/23/2022 3.1  2.4 - 3.5 gm/dL Final    Albumin/Globulin Ratio 07/23/2022 1.5  1.1 - 2.0 ratio Final    Bilirubin Total 07/23/2022 1.3  <=1.5 mg/dL Final    Alkaline Phosphatase 07/23/2022 61  <=750 unit/L Final    Alanine Aminotransferase 07/23/2022 44  0 - 55 unit/L Final    Aspartate Aminotransferase 07/23/2022 25  5 - 34 unit/L Final    Estimated GFR-Non  07/23/2022 >60  mls/min/1.73/m2 Final    Hepatitis C Antibody 07/23/2022 Nonreactive  Nonreactive Final    HIV 07/23/2022 Nonreactive  Nonreactive Final    Cholesterol Total 07/23/2022 135  <=200 mg/dL Final    HDL Cholesterol 07/23/2022 48  35 - 60 mg/dL Final    Triglyceride 07/23/2022 46  34 - 140 mg/dL Final    Cholesterol/HDL Ratio 07/23/2022 3  0 - 5 Final    Very Low Density Lipoprotein 07/23/2022 9   Final    LDL Cholesterol 07/23/2022 78.00  50.00 - 140.00 mg/dL Final    Thyroid Stimulating Hormone 07/23/2022 1.1230  0.3500 - 4.9400 uIU/mL Final    WBC 07/23/2022 4.3 (L)  4.5 - 11.5 x10(3)/mcL Final    RBC 07/23/2022 5.46  4.70 - 6.10 x10(6)/mcL Final    Hgb 07/23/2022 14.8  14.0 - 18.0 gm/dL Final    Hct 07/23/2022 46.7  42.0 - 52.0 % Final    MCV 07/23/2022 85.5  80.0 - 94.0 fL Final    MCH 07/23/2022 27.1  27.0 - 31.0 pg Final    MCHC 07/23/2022 31.7 (L)  33.0 - 36.0 mg/dL Final    RDW 07/23/2022 12.8  11.5 - 17.0 % Final    Platelet 07/23/2022 246  130 - 400 x10(3)/mcL Final    MPV 07/23/2022 10.4  7.4 - 10.4 fL Final    Neut % 07/23/2022 48.3  % Final    Lymph % 07/23/2022 39.3  % Final    Mono % 07/23/2022 8.9  % Final    Eos %  07/23/2022 2.8  % Final    Basophil % 07/23/2022 0.7  % Final    Lymph # 07/23/2022 1.68  0.6 - 4.6 x10(3)/mcL Final    Neut # 07/23/2022 2.1  2.1 - 9.2 x10(3)/mcL Final    Mono # 07/23/2022 0.38  0.1 - 1.3 x10(3)/mcL Final    Eos # 07/23/2022 0.12  0 - 0.9 x10(3)/mcL Final    Baso # 07/23/2022 0.03  0 - 0.2 x10(3)/mcL Final    IG# 07/23/2022 0.00  0 - 0.04 x10(3)/mcL Final    IG% 07/23/2022 0.0  % Final   Office Visit on 05/16/2022   Component Date Value Ref Range Status    Rapid Influenza A Ag 05/16/2022 Negative  Negative Final    Rapid Influenza B Ag 05/16/2022 Negative  Negative Final     Acceptable 05/16/2022 Yes   Final    POC Rapid COVID 05/16/2022 Negative  Negative Final     Acceptable 05/16/2022 Yes   Final    Rapid Strep A Screen 05/16/2022 Positive (A)  Negative Final     Acceptable 05/16/2022 Yes   Final       Assessment and Plan     1. Depression, recurrent  Overview:  DX in teens, no medications just counseling  08/11/2021 - started Wellbutrin  mg daily  02/23/2022 - increased Wellbutrin XL to 300 mg daily        Assessment & Plan:  States doing much better, still on Wellbutrin  mg daily.  Did not do counseling but states that his life has gotten less stressful.  He is now hired on full-time at his job which is more money for him, doing better with his girlfriend, and things at home have gotten better.  Will follow-up next July with wellness.  Patient instructed to please call at any time prior to July if needed.      2. Generalized anxiety disorder  Overview:  DX in teens, no medications just counseling  08/11/2021 - started Wellbutrin  mg daily  02/23/2022 - increased Wellbutrin XL to 300 mg daily    Assessment & Plan:  See above depression assessment note      3. Sore throat  -     POCT Rapid Strep A    4. Sinusitis, unspecified chronicity, unspecified location  Comments:  Celestone 6 mg IM in clinic today, Z-Emmanuel as directed.  Return  for any worsening.  Orders:  -     betamethasone acetate-betamethasone sodium phosphate injection 6 mg  -     azithromycin (Z-DIMITRY) 250 MG tablet           Follow up in about 11 months (around 7/26/2023) for Annual.

## 2022-09-09 NOTE — ASSESSMENT & PLAN NOTE
States doing much better, still on Wellbutrin  mg daily.  Did not do counseling but states that his life has gotten less stressful.  He is now hired on full-time at his job which is more money for him, doing better with his girlfriend, and things at home have gotten better.  Will follow-up next July with wellness.  Patient instructed to please call at any time prior to July if needed.

## 2022-11-01 ENCOUNTER — OFFICE VISIT (OUTPATIENT)
Dept: URGENT CARE | Facility: CLINIC | Age: 22
End: 2022-11-01
Payer: COMMERCIAL

## 2022-11-01 VITALS
OXYGEN SATURATION: 99 % | WEIGHT: 165 LBS | DIASTOLIC BLOOD PRESSURE: 66 MMHG | TEMPERATURE: 98 F | SYSTOLIC BLOOD PRESSURE: 123 MMHG | HEIGHT: 66 IN | HEART RATE: 75 BPM | RESPIRATION RATE: 18 BRPM | BODY MASS INDEX: 26.52 KG/M2

## 2022-11-01 DIAGNOSIS — R11.0 NAUSEA: ICD-10-CM

## 2022-11-01 DIAGNOSIS — R51.9 NONINTRACTABLE HEADACHE, UNSPECIFIED CHRONICITY PATTERN, UNSPECIFIED HEADACHE TYPE: Primary | ICD-10-CM

## 2022-11-01 DIAGNOSIS — R07.9 CHEST PAIN, UNSPECIFIED TYPE: ICD-10-CM

## 2022-11-01 LAB
CTP QC/QA: YES
FLUAV AG NPH QL: NEGATIVE
FLUBV AG NPH QL: NEGATIVE

## 2022-11-01 PROCEDURE — 93000 ELECTROCARDIOGRAM COMPLETE: CPT | Mod: ,,, | Performed by: FAMILY MEDICINE

## 2022-11-01 PROCEDURE — 1160F RVW MEDS BY RX/DR IN RCRD: CPT | Mod: CPTII,,, | Performed by: FAMILY MEDICINE

## 2022-11-01 PROCEDURE — 1160F PR REVIEW ALL MEDS BY PRESCRIBER/CLIN PHARMACIST DOCUMENTED: ICD-10-PCS | Mod: CPTII,,, | Performed by: FAMILY MEDICINE

## 2022-11-01 PROCEDURE — 93000 POCT EKG 12-LEAD: ICD-10-PCS | Mod: ,,, | Performed by: FAMILY MEDICINE

## 2022-11-01 PROCEDURE — 3008F PR BODY MASS INDEX (BMI) DOCUMENTED: ICD-10-PCS | Mod: CPTII,,, | Performed by: FAMILY MEDICINE

## 2022-11-01 PROCEDURE — 3074F SYST BP LT 130 MM HG: CPT | Mod: CPTII,,, | Performed by: FAMILY MEDICINE

## 2022-11-01 PROCEDURE — 99213 PR OFFICE/OUTPT VISIT, EST, LEVL III, 20-29 MIN: ICD-10-PCS | Mod: ,,, | Performed by: FAMILY MEDICINE

## 2022-11-01 PROCEDURE — 87804 POCT INFLUENZA A/B: ICD-10-PCS | Mod: 59,QW,, | Performed by: FAMILY MEDICINE

## 2022-11-01 PROCEDURE — 99213 OFFICE O/P EST LOW 20 MIN: CPT | Mod: ,,, | Performed by: FAMILY MEDICINE

## 2022-11-01 PROCEDURE — 1159F MED LIST DOCD IN RCRD: CPT | Mod: CPTII,,, | Performed by: FAMILY MEDICINE

## 2022-11-01 PROCEDURE — 3008F BODY MASS INDEX DOCD: CPT | Mod: CPTII,,, | Performed by: FAMILY MEDICINE

## 2022-11-01 PROCEDURE — 1159F PR MEDICATION LIST DOCUMENTED IN MEDICAL RECORD: ICD-10-PCS | Mod: CPTII,,, | Performed by: FAMILY MEDICINE

## 2022-11-01 PROCEDURE — 3074F PR MOST RECENT SYSTOLIC BLOOD PRESSURE < 130 MM HG: ICD-10-PCS | Mod: CPTII,,, | Performed by: FAMILY MEDICINE

## 2022-11-01 PROCEDURE — 3078F PR MOST RECENT DIASTOLIC BLOOD PRESSURE < 80 MM HG: ICD-10-PCS | Mod: CPTII,,, | Performed by: FAMILY MEDICINE

## 2022-11-01 PROCEDURE — 87804 INFLUENZA ASSAY W/OPTIC: CPT | Mod: QW,,, | Performed by: FAMILY MEDICINE

## 2022-11-01 PROCEDURE — 3078F DIAST BP <80 MM HG: CPT | Mod: CPTII,,, | Performed by: FAMILY MEDICINE

## 2022-11-01 NOTE — PROGRESS NOTES
"Subjective:       Patient ID: Samira Molina is a 22 y.o. male.    Vitals:  height is 5' 6" (1.676 m) and weight is 74.8 kg (165 lb). His temperature is 98.1 °F (36.7 °C). His blood pressure is 123/66 and his pulse is 75. His respiration is 18 and oxygen saturation is 99%.     Chief Complaint: Nausea    22-year-old male presents to clinic stating that this morning he had Nausea, dizzy, and headache.  States he also had chest pain a couple hours ago.  Patient states chest pain runs in his family.  Patient states he has never had evaluated.  Patient states he took Zofran in all his symptoms resolved.  Patient just wanted to be checked out.  Denies any shortness of breath vomiting or diarrhea.  Denies any weakness or numbness in the arms or legs.  Denies any sore throat cough runny nose or upper respiratory symptoms.  Patient vapes.  Denies any alcohol or drug use.  States he takes Wellbutrin for anxiety and depression.  Patient states he did eat 45 pizza rolls before this all started.     Neurological:  Positive for headaches.     Objective:      Physical Exam   Constitutional: He is oriented to person, place, and time.  Non-toxic appearance. He does not appear ill. No distress.   HENT:   Head: Normocephalic and atraumatic.   Ears:   Right Ear: Tympanic membrane normal.   Left Ear: Tympanic membrane normal.   Eyes: Conjunctivae are normal.   Neck: Neck supple.   Cardiovascular: Normal rate and normal pulses.   Pulmonary/Chest: Effort normal.   Abdominal: Normal appearance.   Musculoskeletal:         General: No swelling, tenderness or deformity.   Neurological: He is alert and oriented to person, place, and time. He displays no weakness and normal reflexes. No cranial nerve deficit or sensory deficit. Coordination and gait normal.   Skin: Skin is not diaphoretic.   Psychiatric: His behavior is normal. Mood, judgment and thought content normal.   Vitals reviewed.      Assessment:       1. Nonintractable headache, " unspecified chronicity pattern, unspecified headache type    2. Nausea    3. Chest pain, unspecified type          Plan:       EKG no acute ST changes  Given symptoms have resolved after taking Zofran.  Could very well of been related to diet.  I would recommend speaking with her PCP about your intermittent chest pains.  This could be anxiety related but there are many causes of chest pain.  I always recommend going to the emergency department if you have chest pain.      Nonintractable headache, unspecified chronicity pattern, unspecified headache type    Nausea  -     POCT Influenza A/B    Chest pain, unspecified type  -     POCT EKG 12-LEAD (NOT FOR [facility] USE)

## 2022-11-02 LAB
EKG 12-LEAD: NORMAL
PR INTERVAL: NORMAL
PRT AXES: NORMAL
QRS DURATION: NORMAL
QT/QTC: NORMAL
VENTRICULAR RATE: NORMAL

## 2022-11-18 ENCOUNTER — TELEPHONE (OUTPATIENT)
Dept: FAMILY MEDICINE | Facility: CLINIC | Age: 22
End: 2022-11-18
Payer: COMMERCIAL

## 2022-11-18 NOTE — LETTER
November 21, 2022    Samira Champagne  [address]  [place] [address]             [facility]  [address] [place] [address]  Phone: [phone] To Whomever It May concern,            I currently treat Samira Molina for anxiety and depression. Mr. Molina has a registered service animal, a cat, to help ease his anxiety and depression symptoms. Please allow this service animal to live with him in his home. Thank you for your assistance in this matter.     SHABNAM London   t

## 2022-11-18 NOTE — TELEPHONE ENCOUNTER
Call from patient asking if you can provide a letter to allow him to have a cat as a service animal.

## 2023-02-07 ENCOUNTER — OFFICE VISIT (OUTPATIENT)
Dept: FAMILY MEDICINE | Facility: CLINIC | Age: 23
End: 2023-02-07
Payer: COMMERCIAL

## 2023-02-07 VITALS
RESPIRATION RATE: 16 BRPM | HEIGHT: 66 IN | SYSTOLIC BLOOD PRESSURE: 118 MMHG | DIASTOLIC BLOOD PRESSURE: 76 MMHG | HEART RATE: 73 BPM | OXYGEN SATURATION: 98 % | WEIGHT: 170.81 LBS | TEMPERATURE: 99 F | BODY MASS INDEX: 27.45 KG/M2

## 2023-02-07 DIAGNOSIS — F41.1 GENERALIZED ANXIETY DISORDER: ICD-10-CM

## 2023-02-07 DIAGNOSIS — H54.7 DECREASED VISION: ICD-10-CM

## 2023-02-07 DIAGNOSIS — F33.9 DEPRESSION, RECURRENT: Primary | ICD-10-CM

## 2023-02-07 DIAGNOSIS — R06.00 DYSPNEA, UNSPECIFIED TYPE: ICD-10-CM

## 2023-02-07 PROCEDURE — 1159F PR MEDICATION LIST DOCUMENTED IN MEDICAL RECORD: ICD-10-PCS | Mod: CPTII,,, | Performed by: NURSE PRACTITIONER

## 2023-02-07 PROCEDURE — 3008F PR BODY MASS INDEX (BMI) DOCUMENTED: ICD-10-PCS | Mod: CPTII,,, | Performed by: NURSE PRACTITIONER

## 2023-02-07 PROCEDURE — 1160F PR REVIEW ALL MEDS BY PRESCRIBER/CLIN PHARMACIST DOCUMENTED: ICD-10-PCS | Mod: CPTII,,, | Performed by: NURSE PRACTITIONER

## 2023-02-07 PROCEDURE — 3078F DIAST BP <80 MM HG: CPT | Mod: CPTII,,, | Performed by: NURSE PRACTITIONER

## 2023-02-07 PROCEDURE — 3074F PR MOST RECENT SYSTOLIC BLOOD PRESSURE < 130 MM HG: ICD-10-PCS | Mod: CPTII,,, | Performed by: NURSE PRACTITIONER

## 2023-02-07 PROCEDURE — 1160F RVW MEDS BY RX/DR IN RCRD: CPT | Mod: CPTII,,, | Performed by: NURSE PRACTITIONER

## 2023-02-07 PROCEDURE — 3008F BODY MASS INDEX DOCD: CPT | Mod: CPTII,,, | Performed by: NURSE PRACTITIONER

## 2023-02-07 PROCEDURE — 3078F PR MOST RECENT DIASTOLIC BLOOD PRESSURE < 80 MM HG: ICD-10-PCS | Mod: CPTII,,, | Performed by: NURSE PRACTITIONER

## 2023-02-07 PROCEDURE — 1159F MED LIST DOCD IN RCRD: CPT | Mod: CPTII,,, | Performed by: NURSE PRACTITIONER

## 2023-02-07 PROCEDURE — 3074F SYST BP LT 130 MM HG: CPT | Mod: CPTII,,, | Performed by: NURSE PRACTITIONER

## 2023-02-07 PROCEDURE — 99213 PR OFFICE/OUTPT VISIT, EST, LEVL III, 20-29 MIN: ICD-10-PCS | Mod: ,,, | Performed by: NURSE PRACTITIONER

## 2023-02-07 PROCEDURE — 99213 OFFICE O/P EST LOW 20 MIN: CPT | Mod: ,,, | Performed by: NURSE PRACTITIONER

## 2023-02-07 RX ORDER — ALBUTEROL SULFATE 90 UG/1
2 AEROSOL, METERED RESPIRATORY (INHALATION) EVERY 6 HOURS PRN
Qty: 18 G | Refills: 0 | Status: SHIPPED | OUTPATIENT
Start: 2023-02-07 | End: 2024-02-07

## 2023-02-07 RX ORDER — BUPROPION HYDROCHLORIDE 150 MG/1
150 TABLET ORAL DAILY
Qty: 30 TABLET | Refills: 11 | Status: SHIPPED | OUTPATIENT
Start: 2023-02-07 | End: 2024-02-07

## 2023-02-07 NOTE — ASSESSMENT & PLAN NOTE
Decrease Wellbutrin XL to 150 mg daily, follow-up in July as scheduled.  Patient instructed to monitor closely for increase in anxiety or depression and call office for any concerns.

## 2023-02-07 NOTE — PATIENT INSTRUCTIONS
Complete your lung function test and chest xray at [facility], I will call with results.

## 2023-02-07 NOTE — PROGRESS NOTES
Subjective:       Patient ID: Samira Molina is a 22 y.o. male.    Chief Complaint: Anxiety (Anxiety has not been as severe. Would like to discuss lowering dosage of Wellbutrin.), Breathing Problem (Will randomly feel like he is unable to take a good deep breath/lungs feel tight and heavy. Intermittent for 2-3 weeks.), and changes in vision (Blurred vision when trying to see things in the distant for the past 2 months.)      HPI   This is a 22-year-old [place] male who presents to clinic today to discuss decreasing his anxiety and depression medicine.  States that things are going very well for him right now.  He has a job that he enjoys and moved out on his own.  Feels like his life is much less stressful.  Would like to try decreasing his Wellbutrin.  Also complaining of some shortness of breath.  States for the last few weeks he feels like he can not take a deep breath sometimes.  Denies any anxiety when this is happening.  Does report upper respiratory infection in December that he was treated for.  Also having issues with seeing things far away.  Feels like his vision is blurry when he looks at far away things.    Review of Systems  Comprehensive review of systems negative except as stated in HPI    The patient's Health Maintenance was reviewed and the following appears to be due:   There are no preventive care reminders to display for this patient.    Past Medical History:  Past Medical History:   Diagnosis Date    Anxiety disorder, unspecified     Attention-deficit hyperactivity disorder, unspecified type     Depression      Past Surgical History:   Procedure Laterality Date    ADENOIDECTOMY      MOUTH SURGERY      MYRINGOTOMY W/ TUBES       Review of patient's allergies indicates:  No Known Allergies  Current Outpatient Medications on File Prior to Visit   Medication Sig Dispense Refill    [DISCONTINUED] buPROPion (WELLBUTRIN XL) 300 MG 24 hr tablet Take 300 mg by mouth.       No current  "facility-administered medications on file prior to visit.     Social History     Socioeconomic History    Marital status: Single   Tobacco Use    Smoking status: Never    Smokeless tobacco: Never   Substance and Sexual Activity    Alcohol use: Yes     Comment: socially    Drug use: Never    Sexual activity: Yes     Partners: Female     Family History   Family history unknown: Yes       Objective:       /76 (BP Location: Left arm)   Pulse 73   Temp 98.5 °F (36.9 °C) (Oral)   Resp 16   Ht 5' 6" (1.676 m)   Wt 77.5 kg (170 lb 12.8 oz)   SpO2 98%   BMI 27.57 kg/m²      Physical Exam  Vitals and nursing note reviewed.   Constitutional:       Appearance: Normal appearance. He is normal weight.   HENT:      Head: Normocephalic and atraumatic.      Right Ear: Tympanic membrane, ear canal and external ear normal.      Left Ear: Tympanic membrane, ear canal and external ear normal.      Nose: Nose normal.      Mouth/Throat:      Mouth: Mucous membranes are moist.      Pharynx: Oropharynx is clear.   Eyes:      Extraocular Movements: Extraocular movements intact.      Conjunctiva/sclera: Conjunctivae normal.      Pupils: Pupils are equal, round, and reactive to light.   Cardiovascular:      Rate and Rhythm: Normal rate and regular rhythm.      Pulses: Normal pulses.      Heart sounds: Normal heart sounds.   Pulmonary:      Effort: Pulmonary effort is normal.      Breath sounds: Normal breath sounds.   Musculoskeletal:         General: Normal range of motion.      Cervical back: Normal range of motion and neck supple.   Skin:     General: Skin is warm and dry.   Neurological:      General: No focal deficit present.      Mental Status: He is alert and oriented to person, place, and time.   Psychiatric:         Mood and Affect: Mood normal.         Behavior: Behavior normal.         Thought Content: Thought content normal.         Judgment: Judgment normal.           Assessment and Plan       ICD-10-CM ICD-9-CM   1. " Depression, recurrent  F33.9 296.30   2. Generalized anxiety disorder  F41.1 300.02   3. Dyspnea, unspecified type  R06.00 786.09   4. Decreased vision  H54.7 369.9        1. Depression, recurrent  Overview:  DX in teens, no medications just counseling  08/11/2021 - started Wellbutrin  mg daily  02/23/2022 - increased Wellbutrin XL to 300 mg daily  02/07/2023 - decrease Wellbutrin XL to 150 mg daily        Assessment & Plan:  Decrease Wellbutrin XL to 150 mg daily, follow-up in July as scheduled.  Patient instructed to monitor closely for increase in anxiety or depression and call office for any concerns.    Orders:  -     buPROPion (WELLBUTRIN XL) 150 MG TB24 tablet; Take 1 tablet (150 mg total) by mouth once daily.  Dispense: 30 tablet; Refill: 11    2. Generalized anxiety disorder  Overview:  DX in teens, no medications just counseling  08/11/2021 - started Wellbutrin  mg daily  02/23/2022 - increased Wellbutrin XL to 300 mg daily  02/07/2023 - decrease Wellbutrin XL to 150 mg daily    Assessment & Plan:  Decrease Wellbutrin XL to 150 mg daily, follow-up in July as scheduled.  Patient instructed to monitor closely for increase in anxiety or depression and call office for any concerns.    Orders:  -     buPROPion (WELLBUTRIN XL) 150 MG TB24 tablet; Take 1 tablet (150 mg total) by mouth once daily.  Dispense: 30 tablet; Refill: 11    3. Dyspnea, unspecified type  Assessment & Plan:  Chest x-ray, PFT ordered.  Will call with results.  Albuterol as needed.    Orders:  -     Complete PFT w/ bronchodilator; Future  -     X-Ray Chest PA And Lateral; Future; Expected date: 02/07/2023  -     albuterol (VENTOLIN HFA) 90 mcg/actuation inhaler; Inhale 2 puffs into the lungs every 6 (six) hours as needed for Shortness of Breath. Rescue  Dispense: 18 g; Refill: 0    4. Decreased vision  Assessment & Plan:  20/25 right eye and left eye per in office vision chart.  Encouraged to follow-up with optometrist for eye  exam.             Follow up in about 6 months (around 7/26/2023) for follow up.

## 2023-02-07 NOTE — ASSESSMENT & PLAN NOTE
20/25 right eye and left eye per in office vision chart.  Encouraged to follow-up with optometrist for eye exam.

## 2023-02-07 NOTE — LETTER
February 7, 2023    Samira Molina  [address]  [place] [address]             [facility]  [address] [place] [address]  Phone: [phone] To Whomever It May concern,            I currently treat Samira Molina for anxiety and depression. Mr. Molina has a registered service animal, a dog, to help ease his anxiety and depression symptoms. Please allow this service animal to live with him in his home. Thank you for your assistance in this matter.      Lori Mosqueda, SHABNAM-BC

## 2023-02-07 NOTE — LETTER
February 7, 2023      [facility]  [address] [place] [address]  Phone: [phone]       Patient: Samira Molina   YOB: 2000  Date of Visit: 02/07/2023    To Whom It May Concern:    Henrietta Molina  was at [facility] on 02/07/2023. The patient may return to work/school on 02/08/23 with no restrictions. If you have any questions or concerns, or if I can be of further assistance, please do not hesitate to contact me.    Sincerely,    Laila Khan MA

## 2023-07-17 ENCOUNTER — OFFICE VISIT (OUTPATIENT)
Dept: FAMILY MEDICINE | Facility: CLINIC | Age: 23
End: 2023-07-17
Payer: COMMERCIAL

## 2023-07-17 VITALS
HEART RATE: 103 BPM | HEIGHT: 66 IN | BODY MASS INDEX: 29.22 KG/M2 | SYSTOLIC BLOOD PRESSURE: 128 MMHG | OXYGEN SATURATION: 98 % | WEIGHT: 181.81 LBS | RESPIRATION RATE: 16 BRPM | TEMPERATURE: 98 F | DIASTOLIC BLOOD PRESSURE: 84 MMHG

## 2023-07-17 DIAGNOSIS — S46.911D RIGHT SHOULDER STRAIN, SUBSEQUENT ENCOUNTER: Primary | ICD-10-CM

## 2023-07-17 PROCEDURE — 3008F BODY MASS INDEX DOCD: CPT | Mod: CPTII,,, | Performed by: NURSE PRACTITIONER

## 2023-07-17 PROCEDURE — 1159F MED LIST DOCD IN RCRD: CPT | Mod: CPTII,,, | Performed by: NURSE PRACTITIONER

## 2023-07-17 PROCEDURE — 3008F PR BODY MASS INDEX (BMI) DOCUMENTED: ICD-10-PCS | Mod: CPTII,,, | Performed by: NURSE PRACTITIONER

## 2023-07-17 PROCEDURE — 3074F PR MOST RECENT SYSTOLIC BLOOD PRESSURE < 130 MM HG: ICD-10-PCS | Mod: CPTII,,, | Performed by: NURSE PRACTITIONER

## 2023-07-17 PROCEDURE — 3079F DIAST BP 80-89 MM HG: CPT | Mod: CPTII,,, | Performed by: NURSE PRACTITIONER

## 2023-07-17 PROCEDURE — 3079F PR MOST RECENT DIASTOLIC BLOOD PRESSURE 80-89 MM HG: ICD-10-PCS | Mod: CPTII,,, | Performed by: NURSE PRACTITIONER

## 2023-07-17 PROCEDURE — 1160F PR REVIEW ALL MEDS BY PRESCRIBER/CLIN PHARMACIST DOCUMENTED: ICD-10-PCS | Mod: CPTII,,, | Performed by: NURSE PRACTITIONER

## 2023-07-17 PROCEDURE — 3074F SYST BP LT 130 MM HG: CPT | Mod: CPTII,,, | Performed by: NURSE PRACTITIONER

## 2023-07-17 PROCEDURE — 99213 OFFICE O/P EST LOW 20 MIN: CPT | Mod: ,,, | Performed by: NURSE PRACTITIONER

## 2023-07-17 PROCEDURE — 1159F PR MEDICATION LIST DOCUMENTED IN MEDICAL RECORD: ICD-10-PCS | Mod: CPTII,,, | Performed by: NURSE PRACTITIONER

## 2023-07-17 PROCEDURE — 1160F RVW MEDS BY RX/DR IN RCRD: CPT | Mod: CPTII,,, | Performed by: NURSE PRACTITIONER

## 2023-07-17 PROCEDURE — 99213 PR OFFICE/OUTPT VISIT, EST, LEVL III, 20-29 MIN: ICD-10-PCS | Mod: ,,, | Performed by: NURSE PRACTITIONER

## 2023-07-17 RX ORDER — TIZANIDINE HYDROCHLORIDE 4 MG/1
1 CAPSULE, GELATIN COATED ORAL 3 TIMES DAILY
COMMUNITY
Start: 2023-07-03

## 2023-07-17 RX ORDER — NAPROXEN 500 MG/1
500 TABLET ORAL 2 TIMES DAILY
COMMUNITY
Start: 2023-07-03

## 2023-07-17 NOTE — PROGRESS NOTES
Subjective:       Patient ID: Samira Molina is a 22 y.o. male.    Chief Complaint: Shoulder Injury (Injury to R shoulder 3 weeks ago. Went to [facility]. Pain is starting to ease a little but still having sharp pain that increases with use.)      HPI   This is a 22-year-old male who presents to clinic today with complaint of right shoulder pain.  Patient states initially he started having shoulder pain after work 1 day.  Does not remember any specific injury but does do a lot of heavy lifting at work.  A couple of days later, he fell off of a step ladder at his home and landed on his right shoulder and elbow.  States he went to urgent care the next day and they told him he had a shoulder strain and contusion.  He went back to urgent care again to get another prescription of muscle relaxer and they told him to follow-up with myself.  Patient states that the pain is significantly improved but he is still not 100%.  States that it is painful to lift anything with his arm or lift his arm above a certain point.  He would like to see an orthopedic doctor for further evaluation.  Review of Systems  Comprehensive review of systems negative except as stated in HPI    The patient's Health Maintenance was reviewed and the following appears to be due:   There are no preventive care reminders to display for this patient.    Past Medical History:  Past Medical History:   Diagnosis Date    Anxiety disorder, unspecified     Attention-deficit hyperactivity disorder, unspecified type     Depression      Past Surgical History:   Procedure Laterality Date    ADENOIDECTOMY      MOUTH SURGERY      MYRINGOTOMY W/ TUBES       Review of patient's allergies indicates:  No Known Allergies  Current Outpatient Medications on File Prior to Visit   Medication Sig Dispense Refill    buPROPion (WELLBUTRIN XL) 150 MG TB24 tablet Take 1 tablet (150 mg total) by mouth once daily. 30 tablet 11    tiZANidine 4 mg Cap Take 1 capsule by  "mouth 3 (three) times daily.      albuterol (VENTOLIN HFA) 90 mcg/actuation inhaler Inhale 2 puffs into the lungs every 6 (six) hours as needed for Shortness of Breath. Rescue 18 g 0    naproxen (NAPROSYN) 500 MG tablet Take 500 mg by mouth 2 (two) times daily.       No current facility-administered medications on file prior to visit.     Social History     Socioeconomic History    Marital status: Single   Tobacco Use    Smoking status: Never    Smokeless tobacco: Never   Substance and Sexual Activity    Alcohol use: Yes     Comment: socially    Drug use: Never    Sexual activity: Yes     Partners: Female     Family History   Family history unknown: Yes       Objective:       /84 (BP Location: Left arm)   Pulse 103   Temp 98.3 °F (36.8 °C) (Oral)   Resp 16   Ht 5' 6" (1.676 m)   Wt 82.5 kg (181 lb 12.8 oz)   SpO2 98%   BMI 29.34 kg/m²      Physical Exam  Vitals and nursing note reviewed.   Constitutional:       Appearance: Normal appearance.   HENT:      Head: Normocephalic and atraumatic.      Right Ear: Tympanic membrane, ear canal and external ear normal.      Left Ear: Tympanic membrane, ear canal and external ear normal.      Nose: Nose normal.      Mouth/Throat:      Mouth: Mucous membranes are moist.      Pharynx: Oropharynx is clear.   Eyes:      Extraocular Movements: Extraocular movements intact.      Conjunctiva/sclera: Conjunctivae normal.      Pupils: Pupils are equal, round, and reactive to light.   Cardiovascular:      Rate and Rhythm: Normal rate and regular rhythm.      Pulses: Normal pulses.      Heart sounds: Normal heart sounds.   Pulmonary:      Effort: Pulmonary effort is normal.      Breath sounds: Normal breath sounds.   Musculoskeletal:      Right shoulder: Tenderness (Anterior and lateral) present. Decreased range of motion.      Cervical back: Normal range of motion and neck supple.   Skin:     General: Skin is warm and dry.   Neurological:      General: No focal deficit " present.      Mental Status: He is alert and oriented to person, place, and time.   Psychiatric:         Mood and Affect: Mood normal.         Behavior: Behavior normal.         Thought Content: Thought content normal.         Judgment: Judgment normal.           Assessment and Plan       ICD-10-CM ICD-9-CM   1. Right shoulder strain, subsequent encounter  S46.911D V58.89     840.9        1. Right shoulder strain, subsequent encounter  Overview:  Pain initially started after work 06/22/2023 06/25/2023 - fell off step ladder at home onto shoulder and elbow  06/26/2023 - [facility], RX tizanidine  07/03/2023 - [facility], refill tizanidine  07/14/2023 - refer to Dr Godinez     Assessment & Plan:  Patient with some improvement in his shoulder pain but still with continued pain especially with use of the shoulder.  Will refer to orthopedics for further evaluation.  Encouraged patient to take his tizanidine as needed and naproxen as needed.  Patient verbalized understanding.    Orders:  -     Ambulatory referral/consult to Orthopedics; Future; Expected date: 07/24/2023           Follow up if symptoms worsen or fail to improve.

## 2023-07-17 NOTE — ASSESSMENT & PLAN NOTE
Patient with some improvement in his shoulder pain but still with continued pain especially with use of the shoulder.  Will refer to orthopedics for further evaluation.  Encouraged patient to take his tizanidine as needed and naproxen as needed.  Patient verbalized understanding.